# Patient Record
Sex: FEMALE
[De-identification: names, ages, dates, MRNs, and addresses within clinical notes are randomized per-mention and may not be internally consistent; named-entity substitution may affect disease eponyms.]

---

## 2019-09-12 ENCOUNTER — APPOINTMENT (OUTPATIENT)
Dept: OTOLARYNGOLOGY | Facility: CLINIC | Age: 79
End: 2019-09-12
Payer: MEDICARE

## 2019-09-12 PROCEDURE — 92550 TYMPANOMETRY & REFLEX THRESH: CPT

## 2019-09-12 PROCEDURE — 92557 COMPREHENSIVE HEARING TEST: CPT

## 2020-11-20 ENCOUNTER — APPOINTMENT (OUTPATIENT)
Dept: PULMONOLOGY | Facility: CLINIC | Age: 80
End: 2020-11-20
Payer: MEDICARE

## 2020-11-20 ENCOUNTER — NON-APPOINTMENT (OUTPATIENT)
Age: 80
End: 2020-11-20

## 2020-11-20 PROCEDURE — 99203 OFFICE O/P NEW LOW 30 MIN: CPT | Mod: CS,95

## 2020-11-20 NOTE — HISTORY OF PRESENT ILLNESS
[Home] : at home, [unfilled] , at the time of the visit. [Medical Office: ([facility])___] : at the medical office located in  [Verbal consent obtained from patient] : the patient, [unfilled] [FreeTextEntry1] : Video [employer]\par \par CROWN Consult - COVID 19\par \par Healthy 79 year old woman, h/o chronic cough that has been attributed to upper airway and GERD.\par Went for screening COVID testing on 11/17/19, just because it was easy, no symptoms at the time.\par On Wednesday she developed fagitue, "hit by a truck" feeling, possibly a fever , decreased appetite.\par Same yesterday. Today, feeling better. Eating and drinking well. No fever. No SOB or PINO. Has the baseline cough, but no worse. No GI sx.\par Results from 11/17 came back positive this morning\par \par Patient lives alone. Her estranged and "hostile"  lives in the guest room, but they have no contact. They were scheduled for court for divorce on Monday. \par She has neighbors that check iin and help her.\par \par PMH arthritis - takes prolia twice a year.\par No other meds.\par \par Nonsmoker\par \par On exam, appears well, speaking comfortably, no distress. By appearance not obese.\par \par COVID 19 infection -- tested positive on routine screen 11/17 - got results today, developed low level symptoms on 11/18.\par No current high risk concerning sx or medical conditions other than age.\par Appears well, ambulating, good po, no pulm sx, afebrile\par \par For now, rest, fluids, monitor temps and for sob.\par I have given her our number to call 24/7 if anything concerning develops, and I will follow up closely.\par If new developments, will proceed with labs, etc.

## 2020-11-22 ENCOUNTER — NON-APPOINTMENT (OUTPATIENT)
Age: 80
End: 2020-11-22

## 2020-11-22 ENCOUNTER — APPOINTMENT (OUTPATIENT)
Dept: PULMONOLOGY | Facility: CLINIC | Age: 80
End: 2020-11-22

## 2020-11-22 ENCOUNTER — APPOINTMENT (OUTPATIENT)
Dept: PULMONOLOGY | Facility: CLINIC | Age: 80
End: 2020-11-22
Payer: MEDICARE

## 2020-11-22 DIAGNOSIS — U07.1 COVID-19: ICD-10-CM

## 2020-11-22 PROCEDURE — 99213 OFFICE O/P EST LOW 20 MIN: CPT | Mod: CS,95

## 2020-11-22 NOTE — HISTORY OF PRESENT ILLNESS
[Home] : at home, [unfilled] , at the time of the visit. [Medical Office: ([facility])___] : at the medical office located in  [Verbal consent obtained from patient] : the patient, [unfilled] [FreeTextEntry1] : COVID follow up.\par \par No sob/PINO\par Still with cough, mild, feels to her like post - nasal dripp\par No fevers\par Eating and drinking well, neighbor left her a lot of food outside her door.\par Does have some ache in her back, worsens with movement.\par \par On exam, appears well , no distress. Speaking easily, no sob\par \par Will follow . Home labs for tomorrow

## 2020-11-23 ENCOUNTER — LABORATORY RESULT (OUTPATIENT)
Age: 80
End: 2020-11-23

## 2020-11-24 ENCOUNTER — TRANSCRIPTION ENCOUNTER (OUTPATIENT)
Age: 80
End: 2020-11-24

## 2020-11-25 ENCOUNTER — APPOINTMENT (OUTPATIENT)
Dept: PULMONOLOGY | Facility: CLINIC | Age: 80
End: 2020-11-25
Payer: MEDICARE

## 2020-11-25 ENCOUNTER — NON-APPOINTMENT (OUTPATIENT)
Age: 80
End: 2020-11-25

## 2020-11-25 PROCEDURE — 99442: CPT | Mod: CS,95

## 2020-11-25 NOTE — HISTORY OF PRESENT ILLNESS
[Home] : at home, [unfilled] , at the time of the visit. [Medical Office: ([facility])___] : at the medical office located in  [Verbal consent obtained from patient] : the patient, [unfilled] [FreeTextEntry1] : COVID Telephone follow up\par \par Feeling overall better\par Some nasal congestion with mild cough\par No fevers\par No SOB\par good appetite\par Labs OK, only minimal elevation in ferritin CRP.\par \par Pt continues to recover well.\par Out of isolation on Friday\par Let me know if new sx develop

## 2025-04-08 ENCOUNTER — APPOINTMENT (OUTPATIENT)
Dept: SURGERY | Facility: CLINIC | Age: 85
End: 2025-04-08
Payer: MEDICARE

## 2025-04-08 ENCOUNTER — NON-APPOINTMENT (OUTPATIENT)
Age: 85
End: 2025-04-08

## 2025-04-08 VITALS
DIASTOLIC BLOOD PRESSURE: 82 MMHG | HEIGHT: 65 IN | OXYGEN SATURATION: 96 % | WEIGHT: 166 LBS | BODY MASS INDEX: 27.66 KG/M2 | TEMPERATURE: 97.2 F | SYSTOLIC BLOOD PRESSURE: 158 MMHG | HEART RATE: 74 BPM

## 2025-04-08 DIAGNOSIS — K46.9 UNSPECIFIED ABDOMINAL HERNIA W/OUT OBSTRUCTION OR GANGRENE: ICD-10-CM

## 2025-04-08 DIAGNOSIS — K40.90 UNILATERAL INGUINAL HERNIA, W/OUT OBSTRUCTION OR GANGRENE, NOT SPECIFIED AS RECURRENT: ICD-10-CM

## 2025-04-08 DIAGNOSIS — R19.09 OTHER INTRA-ABDOMINAL AND PELVIC SWELLING, MASS AND LUMP: ICD-10-CM

## 2025-04-08 PROCEDURE — 99203 OFFICE O/P NEW LOW 30 MIN: CPT

## 2025-07-10 ENCOUNTER — NON-APPOINTMENT (OUTPATIENT)
Age: 85
End: 2025-07-10